# Patient Record
Sex: MALE | Race: WHITE | Employment: FULL TIME | URBAN - METROPOLITAN AREA
[De-identification: names, ages, dates, MRNs, and addresses within clinical notes are randomized per-mention and may not be internally consistent; named-entity substitution may affect disease eponyms.]

---

## 2024-04-11 ENCOUNTER — HOSPITAL ENCOUNTER (OUTPATIENT)
Dept: GENERAL RADIOLOGY | Facility: CLINIC | Age: 44
Discharge: HOME | End: 2024-04-11
Attending: SPECIALIST | Admitting: SPECIALIST
Payer: COMMERCIAL

## 2024-04-11 DIAGNOSIS — Z22.7 LATENT TUBERCULOSIS INFECTION: ICD-10-CM

## 2024-04-11 PROCEDURE — 71046 X-RAY EXAM CHEST 2 VIEWS: CPT

## 2024-04-19 DIAGNOSIS — Z22.7 INACTIVE TUBERCULOSIS: Primary | ICD-10-CM

## 2024-04-22 ENCOUNTER — LAB (OUTPATIENT)
Dept: LAB | Facility: OTHER | Age: 44
End: 2024-04-22
Payer: COMMERCIAL

## 2024-04-22 DIAGNOSIS — Z22.7 INACTIVE TUBERCULOSIS: ICD-10-CM

## 2024-04-22 LAB
ALBUMIN SERPL BCG-MCNC: 5 G/DL (ref 3.5–5.2)
ALP SERPL-CCNC: 74 U/L (ref 40–150)
ALT SERPL W P-5'-P-CCNC: 36 U/L (ref 0–70)
AST SERPL W P-5'-P-CCNC: 29 U/L (ref 0–45)
BASOPHILS # BLD AUTO: 0 10E3/UL (ref 0–0.2)
BASOPHILS NFR BLD AUTO: 1 %
BILIRUB DIRECT SERPL-MCNC: <0.2 MG/DL (ref 0–0.3)
BILIRUB SERPL-MCNC: 0.5 MG/DL
CREAT SERPL-MCNC: 1.12 MG/DL (ref 0.67–1.17)
EGFRCR SERPLBLD CKD-EPI 2021: 83 ML/MIN/1.73M2
EOSINOPHIL # BLD AUTO: 0.5 10E3/UL (ref 0–0.7)
EOSINOPHIL NFR BLD AUTO: 6 %
ERYTHROCYTE [DISTWIDTH] IN BLOOD BY AUTOMATED COUNT: 14.2 % (ref 10–15)
HCT VFR BLD AUTO: 45.7 % (ref 40–53)
HGB BLD-MCNC: 14.9 G/DL (ref 13.3–17.7)
IMM GRANULOCYTES # BLD: 0 10E3/UL
IMM GRANULOCYTES NFR BLD: 0 %
LYMPHOCYTES # BLD AUTO: 3.2 10E3/UL (ref 0.8–5.3)
LYMPHOCYTES NFR BLD AUTO: 38 %
MCH RBC QN AUTO: 27.3 PG (ref 26.5–33)
MCHC RBC AUTO-ENTMCNC: 32.6 G/DL (ref 31.5–36.5)
MCV RBC AUTO: 84 FL (ref 78–100)
MONOCYTES # BLD AUTO: 0.7 10E3/UL (ref 0–1.3)
MONOCYTES NFR BLD AUTO: 8 %
NEUTROPHILS # BLD AUTO: 4 10E3/UL (ref 1.6–8.3)
NEUTROPHILS NFR BLD AUTO: 48 %
PLATELET # BLD AUTO: 391 10E3/UL (ref 150–450)
PROT SERPL-MCNC: 7.7 G/DL (ref 6.4–8.3)
RBC # BLD AUTO: 5.45 10E6/UL (ref 4.4–5.9)
WBC # BLD AUTO: 8.4 10E3/UL (ref 4–11)

## 2024-04-22 PROCEDURE — 80076 HEPATIC FUNCTION PANEL: CPT

## 2024-04-22 PROCEDURE — 82565 ASSAY OF CREATININE: CPT

## 2024-04-22 PROCEDURE — 85025 COMPLETE CBC W/AUTO DIFF WBC: CPT

## 2024-04-22 PROCEDURE — 36415 COLL VENOUS BLD VENIPUNCTURE: CPT

## 2024-04-23 DIAGNOSIS — Z22.7 INACTIVE TUBERCULOSIS: Primary | ICD-10-CM

## 2024-05-06 ENCOUNTER — LAB (OUTPATIENT)
Dept: LAB | Facility: OTHER | Age: 44
End: 2024-05-06
Payer: COMMERCIAL

## 2024-05-06 DIAGNOSIS — Z22.7 INACTIVE TUBERCULOSIS: ICD-10-CM

## 2024-05-06 LAB
ALBUMIN SERPL BCG-MCNC: 4.9 G/DL (ref 3.5–5.2)
ALP SERPL-CCNC: 75 U/L (ref 40–150)
ALT SERPL W P-5'-P-CCNC: 59 U/L (ref 0–70)
AST SERPL W P-5'-P-CCNC: 38 U/L (ref 0–45)
BILIRUB DIRECT SERPL-MCNC: <0.2 MG/DL (ref 0–0.3)
BILIRUB SERPL-MCNC: 0.5 MG/DL
PROT SERPL-MCNC: 7.6 G/DL (ref 6.4–8.3)

## 2024-05-06 PROCEDURE — 36415 COLL VENOUS BLD VENIPUNCTURE: CPT

## 2024-05-06 PROCEDURE — 80076 HEPATIC FUNCTION PANEL: CPT

## 2024-06-03 ENCOUNTER — LAB (OUTPATIENT)
Dept: LAB | Facility: OTHER | Age: 44
End: 2024-06-03
Payer: COMMERCIAL

## 2024-06-03 DIAGNOSIS — Z22.7 INACTIVE TUBERCULOSIS: ICD-10-CM

## 2024-06-03 LAB
ALBUMIN SERPL BCG-MCNC: 4.7 G/DL (ref 3.5–5.2)
ALP SERPL-CCNC: 69 U/L (ref 40–150)
ALT SERPL W P-5'-P-CCNC: 51 U/L (ref 0–70)
AST SERPL W P-5'-P-CCNC: 36 U/L (ref 0–45)
BILIRUB DIRECT SERPL-MCNC: <0.2 MG/DL (ref 0–0.3)
BILIRUB SERPL-MCNC: 0.4 MG/DL
PROT SERPL-MCNC: 7.4 G/DL (ref 6.4–8.3)

## 2024-06-03 PROCEDURE — 36415 COLL VENOUS BLD VENIPUNCTURE: CPT

## 2024-06-03 PROCEDURE — 80076 HEPATIC FUNCTION PANEL: CPT

## 2024-07-01 ENCOUNTER — LAB (OUTPATIENT)
Dept: LAB | Facility: OTHER | Age: 44
End: 2024-07-01
Payer: COMMERCIAL

## 2024-07-01 DIAGNOSIS — Z22.7 INACTIVE TUBERCULOSIS: ICD-10-CM

## 2024-07-01 LAB
ALBUMIN SERPL BCG-MCNC: 4.8 G/DL (ref 3.5–5.2)
ALP SERPL-CCNC: 69 U/L (ref 40–150)
ALT SERPL W P-5'-P-CCNC: 44 U/L (ref 0–70)
AST SERPL W P-5'-P-CCNC: 36 U/L (ref 0–45)
BILIRUB DIRECT SERPL-MCNC: <0.2 MG/DL (ref 0–0.3)
BILIRUB SERPL-MCNC: 0.3 MG/DL
PROT SERPL-MCNC: 7.6 G/DL (ref 6.4–8.3)

## 2024-07-01 PROCEDURE — 36415 COLL VENOUS BLD VENIPUNCTURE: CPT

## 2024-07-01 PROCEDURE — 80076 HEPATIC FUNCTION PANEL: CPT

## 2024-10-21 ENCOUNTER — OFFICE VISIT (OUTPATIENT)
Dept: FAMILY MEDICINE | Facility: OTHER | Age: 44
End: 2024-10-21
Payer: COMMERCIAL

## 2024-10-21 VITALS
DIASTOLIC BLOOD PRESSURE: 87 MMHG | BODY MASS INDEX: 32.15 KG/M2 | RESPIRATION RATE: 15 BRPM | SYSTOLIC BLOOD PRESSURE: 127 MMHG | WEIGHT: 250.5 LBS | TEMPERATURE: 96.9 F | HEART RATE: 72 BPM | OXYGEN SATURATION: 91 % | HEIGHT: 74 IN

## 2024-10-21 DIAGNOSIS — B96.89 BACTERIAL SINUSITIS: Primary | ICD-10-CM

## 2024-10-21 DIAGNOSIS — J32.9 BACTERIAL SINUSITIS: Primary | ICD-10-CM

## 2024-10-21 PROCEDURE — 99213 OFFICE O/P EST LOW 20 MIN: CPT | Performed by: STUDENT IN AN ORGANIZED HEALTH CARE EDUCATION/TRAINING PROGRAM

## 2024-10-21 RX ORDER — DOXYCYCLINE 100 MG/1
100 CAPSULE ORAL 2 TIMES DAILY
Qty: 14 CAPSULE | Refills: 0 | Status: SHIPPED | OUTPATIENT
Start: 2024-10-21 | End: 2024-10-28

## 2024-10-21 ASSESSMENT — PAIN SCALES - GENERAL: PAINLEVEL: NO PAIN (0)

## 2024-10-21 NOTE — PATIENT INSTRUCTIONS
Symptomatic management for your sinuses  --    General - Drink extra water and other fluids (water being the best), LOTS of hand washing (or hand ) and covering coughs, Avoiding smoking and exposure to secondhand smoke, Rest (consider work/school note), Time - these infections can last up to 2 weeks or more, consider NoseFrida (or bulb suctioning) in Kids    Analgesics and antipyretics (such as tylenol and ibuprofen) -- Over-the-counter analgesics and antipyretics such as nonsteroidal antiinflammatory drugs (NSAIDs) and acetaminophen can be used for pain and fever relief as needed assuming it is safe to take these medications with a patients past medical history     Saline irrigation (such as Simple Saline or Netti Pot) -- Mechanical irrigation with buffered, physiologic, or hypertonic saline may reduce the need for pain medication and improve overall patient comfort, particularly in patients with frequent sinus infections. Sterile intranasal saline spray may temporarily improve nasal passage patency by moisturizing and loosening secretions.    Intranasal glucocorticoids (such as Flonase) -- Studies have shown small symptomatic benefits and minimal adverse effects with short-term use of intranasal glucocorticoids for patients with both viral and bacterial ARS. Intranasal glucocorticoids are likely to be most beneficial for patients with underlying allergic rhinitis. The mechanism of action is a decrease in mucosal inflammation that allows improved sinus drainage. It is BEST to use Simple saline/Netti Pot right before using nasal sprays    Oral decongestants (such as Sudafed) - Oral decongestants may be useful when Eustachian tube dysfunction (plugged ear feeling) is a factor for patients with AVRS. These patients may benefit from a short course (three to five days) of oral decongestants. Oral decongestants should be used with caution in patients with cardiovascular disease, hypertension,  angle-closure glaucoma, or bladder neck obstruction     Intranasal decongestants (such as Afrin) - Intranasal decongestants are often used as symptomatic therapies by patients. These agents, such as oxymetazoline, may provide a subjective sense of improved nasal patency. If used, topical decongestants should be used sparingly for no more than three consecutive days to avoid rebound congestion, addiction, and mucosal damage associated with long-term use    Antihistamines (such as Zyrtec, Allegra, Claritin) - Antihistamines are frequently used for symptom relief due to their drying effects    Mucolytics (such as Mucinex or Robitussin) - Mucolytics such as guaifenesin serve to thin secretions and may promote ease of mucus drainage and clearance    Steam inhalation or  tenting  - Inhalation of warm, humidified air (steam), may provide patients with a transient sense of relief of congestion. If steam is used, care should be taken to ensure that the source of steam is clean, without mold or other contaminants.    If you feel that symptoms are worsening or if you are not getting better in a timely manor, would be best to get rechecked or seen more emergently (such as the ER).       Reference: [place]. Reviewed: 2023

## 2024-10-21 NOTE — PROGRESS NOTES
Assessment & Plan     Bacterial sinusitis  - doxycycline hyclate (VIBRAMYCIN) 100 MG capsule; Take 1 capsule (100 mg) by mouth 2 times daily for 7 days.  Patient with ongoing and overall chronic sinus congestion for the better part of a year, this originally started after he stopped smoking.  He has tried multiple over-the-counter treatment options with little benefit, he does mention Afrin has been the most successful for him, he states he does use this sometimes several times a week with a short duration of relief but has returned.  I do have a concern for rhinitis medicamentosa, I do want him to stop this completely but consider other over-the-counter nasal spray options including Flonase and Nasacort.  Oral options were discussed and other conservative measures outlined on the AVS, plan for 6-week follow-up for annual checkup        Alyse Wang is a 44 year old, presenting for the following health issues:  Sinus Problem        10/21/2024     9:06 AM   Additional Questions   Roomed by linda   Accompanied by self         10/21/2024     9:06 AM   Patient Reported Additional Medications   Patient reports taking the following new medications LIALDA ,traZODone 100 mg     History of Present Illness       Reason for visit:  Sinus congesstion  Symptom onset:  More than a month   He is taking medications regularly.         Concern - sinus problem  Onset: June 2024  Description: left nostril is plugged more then right side, clear mucus, dentist noticed sinuses were inflamed based on xray  Intensity: moderate  Progression of Symptoms:  worsening  Accompanying Signs & Symptoms: none  Previous history of similar problem: none  Precipitating factors:        Worsened by: none  Alleviating factors:        Improved by: afirn  Therapies tried and outcome: afirn         Review of Systems  Constitutional, HEENT, cardiovascular, pulmonary, gi and gu systems are negative, except as otherwise noted.      Objective    BP  "127/87   Pulse 72   Temp 96.9  F (36.1  C) (Temporal)   Resp 15   Ht 1.87 m (6' 1.62\")   Wt 113.6 kg (250 lb 8 oz)   SpO2 91%   BMI 32.49 kg/m    Body mass index is 32.49 kg/m .  Physical Exam  Vitals and nursing note reviewed.   Constitutional:       General: He is not in acute distress.     Appearance: Normal appearance. He is not ill-appearing, toxic-appearing or diaphoretic.   HENT:      Head: Normocephalic.      Right Ear: External ear normal.      Left Ear: External ear normal.      Nose: No rhinorrhea.   Eyes:      General:         Right eye: No discharge.         Left eye: No discharge.      Extraocular Movements: Extraocular movements intact.      Conjunctiva/sclera: Conjunctivae normal.      Pupils: Pupils are equal, round, and reactive to light.   Pulmonary:      Effort: Pulmonary effort is normal. No respiratory distress.   Musculoskeletal:         General: Normal range of motion.      Cervical back: Normal range of motion.   Neurological:      Mental Status: He is alert and oriented to person, place, and time.   Psychiatric:         Mood and Affect: Mood normal.         Behavior: Behavior normal.            Signed Electronically by: DORON PEREZ MD    "

## 2024-12-02 ENCOUNTER — OFFICE VISIT (OUTPATIENT)
Dept: FAMILY MEDICINE | Facility: OTHER | Age: 44
End: 2024-12-02
Payer: COMMERCIAL

## 2024-12-02 VITALS
SYSTOLIC BLOOD PRESSURE: 136 MMHG | TEMPERATURE: 98 F | HEART RATE: 70 BPM | WEIGHT: 255 LBS | RESPIRATION RATE: 18 BRPM | OXYGEN SATURATION: 97 % | HEIGHT: 73 IN | BODY MASS INDEX: 33.8 KG/M2 | DIASTOLIC BLOOD PRESSURE: 84 MMHG

## 2024-12-02 DIAGNOSIS — Z22.7 INACTIVE TUBERCULOSIS: ICD-10-CM

## 2024-12-02 DIAGNOSIS — K51.918 ULCERATIVE COLITIS WITH OTHER COMPLICATION, UNSPECIFIED LOCATION (H): ICD-10-CM

## 2024-12-02 DIAGNOSIS — Z00.00 ROUTINE GENERAL MEDICAL EXAMINATION AT A HEALTH CARE FACILITY: Primary | ICD-10-CM

## 2024-12-02 DIAGNOSIS — E66.9 OBESITY (BMI 30-39.9): ICD-10-CM

## 2024-12-02 DIAGNOSIS — Z11.59 NEED FOR HEPATITIS C SCREENING TEST: ICD-10-CM

## 2024-12-02 DIAGNOSIS — Z11.4 SCREENING FOR HIV (HUMAN IMMUNODEFICIENCY VIRUS): ICD-10-CM

## 2024-12-02 DIAGNOSIS — Z12.5 SCREENING FOR PROSTATE CANCER: ICD-10-CM

## 2024-12-02 DIAGNOSIS — J32.1 CHRONIC FRONTAL SINUSITIS: ICD-10-CM

## 2024-12-02 DIAGNOSIS — F51.01 PRIMARY INSOMNIA: ICD-10-CM

## 2024-12-02 LAB
ALBUMIN SERPL BCG-MCNC: 4.6 G/DL (ref 3.5–5.2)
ALP SERPL-CCNC: 75 U/L (ref 40–150)
ALT SERPL W P-5'-P-CCNC: 37 U/L (ref 0–70)
AST SERPL W P-5'-P-CCNC: 31 U/L (ref 0–45)
BILIRUB DIRECT SERPL-MCNC: <0.2 MG/DL (ref 0–0.3)
BILIRUB SERPL-MCNC: 0.3 MG/DL
CHOLEST SERPL-MCNC: 254 MG/DL
EST. AVERAGE GLUCOSE BLD GHB EST-MCNC: 131 MG/DL
FASTING STATUS PATIENT QL REPORTED: YES
HBA1C MFR BLD: 6.2 % (ref 0–5.6)
HDLC SERPL-MCNC: 35 MG/DL
LDLC SERPL CALC-MCNC: 181 MG/DL
NONHDLC SERPL-MCNC: 219 MG/DL
PROT SERPL-MCNC: 7.3 G/DL (ref 6.4–8.3)
PSA SERPL DL<=0.01 NG/ML-MCNC: 0.34 NG/ML (ref 0–2.5)
TRIGL SERPL-MCNC: 190 MG/DL

## 2024-12-02 PROCEDURE — 99396 PREV VISIT EST AGE 40-64: CPT | Performed by: STUDENT IN AN ORGANIZED HEALTH CARE EDUCATION/TRAINING PROGRAM

## 2024-12-02 PROCEDURE — 99213 OFFICE O/P EST LOW 20 MIN: CPT | Mod: 25 | Performed by: STUDENT IN AN ORGANIZED HEALTH CARE EDUCATION/TRAINING PROGRAM

## 2024-12-02 PROCEDURE — 83036 HEMOGLOBIN GLYCOSYLATED A1C: CPT | Performed by: STUDENT IN AN ORGANIZED HEALTH CARE EDUCATION/TRAINING PROGRAM

## 2024-12-02 PROCEDURE — 80076 HEPATIC FUNCTION PANEL: CPT | Performed by: STUDENT IN AN ORGANIZED HEALTH CARE EDUCATION/TRAINING PROGRAM

## 2024-12-02 PROCEDURE — 36415 COLL VENOUS BLD VENIPUNCTURE: CPT | Performed by: STUDENT IN AN ORGANIZED HEALTH CARE EDUCATION/TRAINING PROGRAM

## 2024-12-02 PROCEDURE — G0103 PSA SCREENING: HCPCS | Performed by: STUDENT IN AN ORGANIZED HEALTH CARE EDUCATION/TRAINING PROGRAM

## 2024-12-02 PROCEDURE — 86803 HEPATITIS C AB TEST: CPT | Performed by: STUDENT IN AN ORGANIZED HEALTH CARE EDUCATION/TRAINING PROGRAM

## 2024-12-02 PROCEDURE — 80061 LIPID PANEL: CPT | Performed by: STUDENT IN AN ORGANIZED HEALTH CARE EDUCATION/TRAINING PROGRAM

## 2024-12-02 RX ORDER — TRAZODONE HYDROCHLORIDE 100 MG/1
100 TABLET ORAL AT BEDTIME
COMMUNITY

## 2024-12-02 SDOH — HEALTH STABILITY: PHYSICAL HEALTH: ON AVERAGE, HOW MANY DAYS PER WEEK DO YOU ENGAGE IN MODERATE TO STRENUOUS EXERCISE (LIKE A BRISK WALK)?: 1 DAY

## 2024-12-02 SDOH — HEALTH STABILITY: PHYSICAL HEALTH: ON AVERAGE, HOW MANY MINUTES DO YOU ENGAGE IN EXERCISE AT THIS LEVEL?: 30 MIN

## 2024-12-02 ASSESSMENT — SOCIAL DETERMINANTS OF HEALTH (SDOH): HOW OFTEN DO YOU GET TOGETHER WITH FRIENDS OR RELATIVES?: TWICE A WEEK

## 2024-12-02 ASSESSMENT — PAIN SCALES - GENERAL: PAINLEVEL_OUTOF10: NO PAIN (0)

## 2024-12-02 NOTE — PROGRESS NOTES
"Preventive Care Visit  [facility]  DORON PEREZ MD, Family Medicine  Dec 2, 2024      Assessment & Plan     Routine general medical examination at a health care facility  Health maintenance reviewed and updated  - Lipid panel reflex to direct LDL Fasting    Screening for HIV (human immunodeficiency virus)  Previously screened, negative    Need for hepatitis C screening test  Due for screening  - Hepatitis C Screen Reflex to HCV RNA Quant and Genotype    Primary insomnia  Stable on trazodone 100 mg    Ulcerative colitis with other complication, unspecified location (H)  Follows up with [facility], patient plans on getting his most recent progress notes faxed over to for review.  On LIALDA 1.2 g    Screening for prostate cancer  - PSA, screen    Obesity (BMI 30-39.9)  - Hemoglobin A1c  Healthy lifestyle measures discussed including diet and exercise options. Also, counseled that weight loss would help management and/or prevention of diseases such as diabetes, hypertension, hyperlipidemia, etc...     Chronic frontal sinusitis  Has been giving him issues for multiple years, recent antibiotics did not make any difference for him, takes daily antihistamines which I encouraged him to continue but will also do a trial of Flonase or Nasacort daily for the next couple weeks.  Nasal saline rinses as needed.  Could consider future referral to ENT  - Adult ENT  Referral; Future    Inactive tuberculosis  Follows up with infectious disease.  Previously completed a 4-month course of rifampin, negative chest x-ray  - Hepatic function panel            BMI  Estimated body mass index is 33.25 kg/m  as calculated from the following:    Height as of this encounter: 1.865 m (6' 1.43\").    Weight as of this encounter: 115.7 kg (255 lb).   Weight management plan: Discussed healthy diet and exercise guidelines    Counseling  Appropriate preventive services were addressed with this patient via " screening, questionnaire, or discussion as appropriate for fall prevention, nutrition, physical activity, Tobacco-use cessation, social engagement, weight loss and cognition.  Checklist reviewing preventive services available has been given to the patient.  Reviewed patient's diet, addressing concerns and/or questions.   He is at risk for lack of exercise and has been provided with information to increase physical activity for the benefit of his well-being.   The patient was instructed to see the dentist every 6 months.           Alyse Wang is a 44 year old, presenting for the following:  Physical        12/2/2024     1:47 PM   Additional Questions   Roomed by linda   Accompanied by self         12/2/2024     1:47 PM   Patient Reported Additional Medications   Patient reports taking the following new medications LIALDA 1.2 g          HPI    Concern - Sinus problem  Onset: 6 months ago  Description: clogged when trying to breathing through, clear mucus, 3 weeks ago-green mucus and lost voice but feels better  Intensity: moderate  Progression of Symptoms:  same  Accompanying Signs & Symptoms: none  Previous history of similar problem: yes  Precipitating factors:        Worsened by: none  Alleviating factors:        Improved by: none  Therapies tried and outcome: sudafed, nasal drops    Health Care Directive  Patient does not have a Health Care Directive:     Discussed advance care planning with patient; information given to patient to review.      12/2/2024   General Health   How would you rate your overall physical health? Good   Feel stress (tense, anxious, or unable to sleep) Not at all            12/2/2024   Nutrition   Three or more servings of calcium each day? Yes   Diet: Regular (no restrictions)   How many servings of fruit and vegetables per day? (!) 2-3   How many sweetened beverages each day? 0-1            12/2/2024   Exercise   Days per week of moderate/strenous exercise 1 day   Average  "minutes spent exercising at this level 30 min      (!) EXERCISE CONCERN      12/2/2024   Social Factors   Frequency of gathering with friends or relatives Twice a week   Worry food won't last until get money to buy more No   Food not last or not have enough money for food? No   Do you have housing? (Housing is defined as stable permanent housing and does not include staying ouside in a car, in a tent, in an abandoned building, in an overnight shelter, or couch-surfing.) Yes   Are you worried about losing your housing? No   Lack of transportation? No   Unable to get utilities (heat,electricity)? No            12/2/2024   Dental   Dentist two times every year? (!) NO            12/2/2024   TB Screening   Were you born outside of the [place]? No                  12/2/2024   Substance Use   Alcohol more than 3/day or more than 7/wk No   Do you use any other substances recreationally? No        Social History     Tobacco Use    Smoking status: Former     Types: Cigarettes   Substance Use Topics    Alcohol use: No             12/2/2024   One time HIV Screening   Previous HIV test? Yes          12/2/2024   STI Screening   New sexual partner(s) since last STI/HIV test? No      ASCVD Risk   The ASCVD Risk score (Anne RIGGINS, et al., 2019) failed to calculate for the following reasons:    Cannot find a previous HDL lab    Cannot find a previous total cholesterol lab        12/2/2024   Contraception/Family Planning   Questions about contraception or family planning No           Reviewed and updated as needed this visit by Provider                          Review of Systems  Constitutional, HEENT, cardiovascular, pulmonary, gi and gu systems are negative, except as otherwise noted.     Objective    Exam  /84   Pulse 70   Temp 98  F (36.7  C) (Temporal)   Resp 18   Ht 1.865 m (6' 1.43\")   Wt 115.7 kg (255 lb)   SpO2 97%   BMI 33.25 kg/m     Estimated body mass index is 33.25 kg/m  as calculated from the " "following:    Height as of this encounter: 1.865 m (6' 1.43\").    Weight as of this encounter: 115.7 kg (255 lb).    Physical Exam  GENERAL: alert and no distress  EYES: Eyes grossly normal to inspection, PERRL and conjunctivae and sclerae normal  HENT: ear canals and TM's normal, nose and mouth without ulcers or lesions  NECK: no adenopathy, no asymmetry, masses, or scars  RESP: lungs clear to auscultation - no rales, rhonchi or wheezes  CV: regular rate and rhythm, normal S1 S2, no S3 or S4, no murmur, click or rub, no peripheral edema  ABDOMEN: soft, nontender, no hepatosplenomegaly, no masses and bowel sounds normal  MS: no gross musculoskeletal defects noted, no edema  SKIN: no suspicious lesions or rashes  NEURO: Normal strength and tone, mentation intact and speech normal  PSYCH: mentation appears normal, affect normal/bright        Signed Electronically by: DORON PEREZ MD    "

## 2024-12-02 NOTE — PATIENT INSTRUCTIONS
Seasonal Allergies     1. Antihistamine (Zyrtec/Cetirizine, Allegra/fexofenadine, Claritin/loratadine)  also for itch, sneeze, runny nose, watery eyes, itchy throat, or postnasal drip). Cetirizine tends to be least expensive at [employer] or at [employer].  [employer] is a good place to look for inexpensive cetirizine online. For best results use in combination with:    2. Nasacort (Blue top) daily for up to 10-14 days    3. Netti Pot or simple saline rinse as needed if it helps you (if doing this, do it before using Flonase/Nasacort)      For adults, incidence of drowsiness from antihistamines is 10% with cetirizine (Zyrtec), 1% with loratadine (Claritin), and 0.1% with fexofenadine (Allegra).  Likelihood of drowsiness from antihistamines increases at higher doses, and higher doses are often required for control of urticarial itching.  It is common to use up to 4 times the FDA-recommended doses of cetirizine, loratadine, or fexofenadine.    Reviewed: 2023             Patient Education   Preventive Care Advice   This is general advice given by our system to help you stay healthy. However, your care team may have specific advice just for you. Please talk to your care team about your preventive care needs.  Nutrition  Eat 5 or more servings of fruits and vegetables each day.  Try wheat bread, brown rice and whole grain pasta (instead of white bread, rice, and pasta).  Get enough calcium and vitamin D. Check the label on foods and aim for 100% of the RDA (recommended daily allowance).  Lifestyle  Exercise at least 150 minutes each week  (30 minutes a day, 5 days a week).  Do muscle strengthening activities 2 days a week. These help control your weight and prevent disease.  No smoking.  Wear sunscreen to prevent skin cancer.  Have a dental exam and cleaning every 6 months.  Yearly exams  See your health care team every year to talk about:  Any changes in your health.  Any medicines your care team has  prescribed.  Preventive care, family planning, and ways to prevent chronic diseases.  Shots (vaccines)   HPV shots (up to age 26), if you've never had them before.  Hepatitis B shots (up to age 59), if you've never had them before.  COVID-19 shot: Get this shot when it's due.  Flu shot: Get a flu shot every year.  Tetanus shot: Get a tetanus shot every 10 years.  Pneumococcal, hepatitis A, and RSV shots: Ask your care team if you need these based on your risk.  Shingles shot (for age 50 and up)  General health tests  Diabetes screening:  Starting at age 35, Get screened for diabetes at least every 3 years.  If you are younger than age 35, ask your care team if you should be screened for diabetes.  Cholesterol test: At age 39, start having a cholesterol test every 5 years, or more often if advised.  Bone density scan (DEXA): At age 50, ask your care team if you should have this scan for osteoporosis (brittle bones).  Hepatitis C: Get tested at least once in your life.  STIs (sexually transmitted infections)  Before age 24: Ask your care team if you should be screened for STIs.  After age 24: Get screened for STIs if you're at risk. You are at risk for STIs (including HIV) if:  You are sexually active with more than one person.  You don't use condoms every time.  You or a partner was diagnosed with a sexually transmitted infection.  If you are at risk for HIV, ask about PrEP medicine to prevent HIV.  Get tested for HIV at least once in your life, whether you are at risk for HIV or not.  Cancer screening tests  Cervical cancer screening: If you have a cervix, begin getting regular cervical cancer screening tests starting at age 21.  Breast cancer scan (mammogram): If you've ever had breasts, begin having regular mammograms starting at age 40. This is a scan to check for breast cancer.  Colon cancer screening: It is important to start screening for colon cancer at age 45.  Have a colonoscopy test every 10 years (or more  often if you're at risk) Or, ask your provider about stool tests like a FIT test every year or Cologuard test every 3 years.  To learn more about your testing options, visit:   .  For help making a decision, visit:   https://bit.ly/hn11816.  Prostate cancer screening test: If you have a prostate, ask your care team if a prostate cancer screening test (PSA) at age 55 is right for you.  Lung cancer screening: If you are a current or former smoker ages 50 to 80, ask your care team if ongoing lung cancer screenings are right for you.  For informational purposes only. Not to replace the advice of your health care provider. Copyright   2023 [facility]. All rights reserved. Clinically reviewed by the [facility] Transitions Program. [employer] [id number] - REV 01/24.

## 2024-12-03 LAB — HCV AB SERPL QL IA: NONREACTIVE

## 2024-12-03 NOTE — RESULT ENCOUNTER NOTE
Team - please call patient with results.      Cholesterol levels are elevated, not to the point where I would recommended any prescription medication. I would encourage healthy lifestyle choices including improved diet and more exercise.  Any exercise is reasonable as long as you are elevating your heart rate and breaking a sweat for up to 20 to 30 minutes/day.  In regards to dietary changes cut down on processed food, eliminate added sugar, add more fiber, add more water, and cut down on refined carbohydrates (white bread, pastries, cereals, pastas, etc...).  It is best to home-cook your meals and if you need a snack in between meals consider fruits and vegetables.    Diabetes mellitus screening is within pre diabetes mellitus range, once again no meds needed. Continue with the diet and exercise changes as above    All other labs normal/negative.     Thank you,    Deshawn Camacho MD

## 2025-01-16 NOTE — PROGRESS NOTES
ENT Consultation    Felipe De Leon who is a 44 year old male seen in consultation at the request of Dr. Hemal Camacho.      History of Present Illness - Felipe De Leon is a 44 year old male presents for evaluation of nasal congestion/obstruction.  Is been ongoing for years worse in the last couple years.  He was a smoker but quit 2 years ago.  Initially had less congestion than normal.  Also in the last year to 2 years since sense of smell significantly deteriorated.  Sense of taste apparently remains intact.  He is a mouth breather lately dry mouth in the mornings.  Minimal snoring.  Some maxillary sinus pressure at times but no other sinusitis symptoms.  Clear discharge noted anteriorly and posteriorly without changes in color.  Head CT in 2020 did show evidence of some paranasal sinus disease.  EXAM: CT HEAD BRAIN WO   LOCATION: [facility]   DATE/TIME: 7/23/2020 7:14 PM     INDICATION: Altered mental status. Confusion.   COMPARISON: None.   TECHNIQUE: Routine without IV contrast. Multiplanar reformats. Dose reduction   techniques were used.     FINDINGS:   INTRACRANIAL CONTENTS: No intracranial hemorrhage, extraaxial collection, or   mass effect.  No CT evidence of acute infarct. Normal parenchymal attenuation.   Normal ventricles and sulci.     VISUALIZED ORBITS/SINUSES/MASTOIDS: No intraorbital abnormality. Moderately   mucosal thickening in the ethmoid air cells and right maxillary sinus. Mild   mucosal thickening in the left maxillary sinus. No middle ear or mastoid   effusion.     BONES/SOFT TISSUES: No acute abnormality.     IMPRESSION:   1. The brain is unremarkable.   2.  Mucosal thickening in the paranasal sinuses.   There is no height or weight on file to calculate BMI.    Weight management plan: Patient was referred to their PCP to discuss a diet and exercise plan.    BP Readings from Last 1 Encounters:   12/02/24 136/84       BP noted to be well controlled today in office.     Felipe BAUTISTA NOT  a smoker/uses chewing tobacco.  Felipe already quit      Past Medical History - No past medical history on file.    Current Medications -   Current Outpatient Medications:     traZODone (DESYREL) 100 MG tablet, Take 100 mg by mouth at bedtime., Disp: , Rfl:     Allergies - No Known Allergies    Social History -   Social History     Socioeconomic History    Marital status:    Tobacco Use    Smoking status: Former     Types: Cigarettes    Tobacco comments:     Quit in July 2022, current use of nicotine gum or lozenge      Started smoking aroung age 13, quit age 43. About 1-2 ppd. About 45 pack years    Substance and Sexual Activity    Alcohol use: No     Social Drivers of Health     Financial Resource Strain: Low Risk  (12/2/2024)    Financial Resource Strain     Within the past 12 months, have you or your family members you live with been unable to get utilities (heat, electricity) when it was really needed?: No   Food Insecurity: Low Risk  (12/2/2024)    Food Insecurity     Within the past 12 months, did you worry that your food would run out before you got money to buy more?: No     Within the past 12 months, did the food you bought just not last and you didn t have money to get more?: No   Transportation Needs: Low Risk  (12/2/2024)    Transportation Needs     Within the past 12 months, has lack of transportation kept you from medical appointments, getting your medicines, non-medical meetings or appointments, work, or from getting things that you need?: No   Physical Activity: Insufficiently Active (12/2/2024)    Exercise Vital Sign     Days of Exercise per Week: 1 day     Minutes of Exercise per Session: 30 min   Stress: No Stress Concern Present (12/2/2024)    [employer] [place] - Occupational Stress Questionnaire     Feeling of Stress : Not at all   Social Connections: Unknown (12/2/2024)    Social Connection and Isolation Panel [NHANES]     Frequency of Social Gatherings with  Friends and Family: Twice a week   Interpersonal Safety: Low Risk  (12/2/2024)    Interpersonal Safety     Do you feel physically and emotionally safe where you currently live?: Yes     Within the past 12 months, have you been hit, slapped, kicked or otherwise physically hurt by someone?: No     Within the past 12 months, have you been humiliated or emotionally abused in other ways by your partner or ex-partner?: No   Housing Stability: Low Risk  (12/2/2024)    Housing Stability     Do you have housing? : Yes     Are you worried about losing your housing?: No       Family History - No family history on file.    Review of Systems - As per HPI and PMHx, otherwise review of system review of the head and neck negative. Otherwise 10+ review of system is negative    Physical Exam  There were no vitals taken for this visit.  BMI: There is no height or weight on file to calculate BMI.    General - The patient is well nourished and well developed, and appears to have good nutritional status.  Alert and oriented to person and place, answers questions and cooperates with examination appropriately.    SKIN - No suspicious lesions or rashes.  Respiration - No respiratory distress.  Head and Face - Normocephalic and atraumatic, with no gross asymmetry noted of the contour of the facial features.  The facial nerve is intact, with strong symmetric movements.    Voice and Breathing - The patient was breathing comfortably without the use of accessory muscles. The patients voice was clear and strong, and had appropriate pitch and quality.    Ears - Bilateral pinna and EACs with normal appearing overlying skin. Tympanic membrane intact with good mobility on pneumatic otoscopy bilaterally. Bony landmarks of the ossicular chain are normal. The tympanic membranes are normal in appearance. No retraction, perforation, or masses.  No fluid or purulence was seen in the external canal or the middle ear.     Eyes - Extraocular movements intact.   Sclera were not icteric or injected, conjunctiva were pink and moist.    Mouth - Examination of the oral cavity showed pink, healthy oral mucosa. No lesions or ulcerations noted.  The tongue was mobile and midline, and the dentition were in good condition.      Throat - The walls of the oropharynx were smooth, pink, moist, symmetric, and had no lesions or ulcerations.  The tonsillar pillars and soft palate were symmetric.  The uvula was midline on elevation.    Neck - Normal midline excursion of the laryngotracheal complex during swallowing.  Full range of motion on passive movement.  Palpation of the occipital, submental, submandibular, internal jugular chain, and supraclavicular nodes did not demonstrate any abnormal lymph nodes or masses.  The carotid pulse was palpable bilaterally.  Palpation of the thyroid was soft and smooth, with no nodules or goiter appreciated.  The trachea was mobile and midline.    Nose - External contour is symmetric, no gross deflection or scars.  Nasal mucosa is pink and moist with no abnormal mucus.  The septum was deviated to the left and obstructive, turbinates of large size and position.  No polyps, masses, or purulence noted on examination.    Neuro - Nonfocal neuro exam is normal, CN 2 through 12 intact, normal gait and muscle tone.      Performed in clinic today:  To further evaluate the nasal cavity, I performed rigid nasal endoscopy.  I first sprayed the nasal cavity bilaterally with a mix of lidocaine and neosynephrine.  I then began on the left side using a 2.7mm, 30 degree rigid nasal endoscope.  The septum was deviated and the nasal airway was obstructed.  No abnormal secretions, purulence, however polyps were noted. The left middle turbinate and middle meatus were clearly visualized and pale and edematous in appearance with nonobstructive polyps coming out of the middle meatus.  Looking up, the olfactory cleft was unobstructed.  Going further back, the sphenoethmoid  recess was normal in appearance, with healthy appearing mucosa on the face of the sphenoid.  The nasopharynx was unremarkable, and the eustachian tube opening on this side was unobstructed.    I then turned my attention to the right side.  Once again, the septum was deviated, and the airway was open.  No abnormal secretions, purulence, however polyps were noted.  The right middle turbinate and middle meatus were clearly visualized and edematous and pale in appearance.  Nonobstructive polyps were coming out of middle meatus.  Looking up, the olfactory cleft was unobstructed.  Going further back the right sphenoethmoid recess was normal in appearance, and eustachian tube opening was unobstructed.   Red - [id number] Mytamed      A/P - Felipe De Leon is a 44 year old male patient with nasal obstruction mainly due to deviated septum and large inferior turbinates.  He has tried Nasacort in the past without much relief.  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  At this point will start on budesonide irrigations and given Medrol Dosepak to shrink the polypoid disease is much as possible.  This is likely explanation for his loss of smell.  In regard to nasal congestion obstruction and further evaluation would like to get allergy testing proper referral was made as well as get CT of the sinuses to further understand the extent of sinus disease.  Will see him back again in 2 months to discuss  his progress and decide if any surgical intervention would be appropriate at that point.      Bigg Moncada MD

## 2025-01-22 ENCOUNTER — OFFICE VISIT (OUTPATIENT)
Dept: OTOLARYNGOLOGY | Facility: OTHER | Age: 45
End: 2025-01-22
Attending: STUDENT IN AN ORGANIZED HEALTH CARE EDUCATION/TRAINING PROGRAM
Payer: COMMERCIAL

## 2025-01-22 VITALS
SYSTOLIC BLOOD PRESSURE: 130 MMHG | TEMPERATURE: 96.5 F | BODY MASS INDEX: 33.25 KG/M2 | HEIGHT: 73 IN | WEIGHT: 250.9 LBS | DIASTOLIC BLOOD PRESSURE: 78 MMHG

## 2025-01-22 DIAGNOSIS — J34.2 DEVIATED NASAL SEPTUM: ICD-10-CM

## 2025-01-22 DIAGNOSIS — J32.9 SINUSITIS WITH NASAL POLYPS: Primary | ICD-10-CM

## 2025-01-22 DIAGNOSIS — J33.9 SINUSITIS WITH NASAL POLYPS: Primary | ICD-10-CM

## 2025-01-22 DIAGNOSIS — R43.0 LOSS OF SMELL: ICD-10-CM

## 2025-01-22 DIAGNOSIS — J34.3 HYPERTROPHY OF BOTH INFERIOR NASAL TURBINATES: ICD-10-CM

## 2025-01-22 RX ORDER — METHYLPREDNISOLONE 4 MG/1
TABLET ORAL
Qty: 21 TABLET | Refills: 0 | Status: SHIPPED | OUTPATIENT
Start: 2025-01-22

## 2025-01-22 RX ORDER — MESALAMINE 1.2 G/1
TABLET, DELAYED RELEASE ORAL
COMMUNITY
Start: 2025-01-07

## 2025-01-22 RX ORDER — BUDESONIDE 0.5 MG/2ML
INHALANT ORAL
Qty: 120 ML | Refills: 1 | Status: SHIPPED | OUTPATIENT
Start: 2025-01-22

## 2025-01-22 NOTE — LETTER
1/22/2025      Felipe De Leon  [address] [place] [address]      Dear Colleague,    Thank you for referring your patient, Felipe De Leon, to the [facility]. Please see a copy of my visit note below.    ENT Consultation    Felipe De Leon who is a 44 year old male seen in consultation at the request of Dr. Hemal Camacho.      History of Present Illness - Felipe De Leon is a 44 year old male presents for evaluation of nasal congestion/obstruction.  Is been ongoing for years worse in the last couple years.  He was a smoker but quit 2 years ago.  Initially had less congestion than normal.  Also in the last year to 2 years since sense of smell significantly deteriorated.  Sense of taste apparently remains intact.  He is a mouth breather lately dry mouth in the mornings.  Minimal snoring.  Some maxillary sinus pressure at times but no other sinusitis symptoms.  Clear discharge noted anteriorly and posteriorly without changes in color.  Head CT in 2020 did show evidence of some paranasal sinus disease.  EXAM: CT HEAD BRAIN WO   LOCATION: [facility]   DATE/TIME: 7/23/2020 7:14 PM     INDICATION: Altered mental status. Confusion.   COMPARISON: None.   TECHNIQUE: Routine without IV contrast. Multiplanar reformats. Dose reduction   techniques were used.     FINDINGS:   INTRACRANIAL CONTENTS: No intracranial hemorrhage, extraaxial collection, or   mass effect.  No CT evidence of acute infarct. Normal parenchymal attenuation.   Normal ventricles and sulci.     VISUALIZED ORBITS/SINUSES/MASTOIDS: No intraorbital abnormality. Moderately   mucosal thickening in the ethmoid air cells and right maxillary sinus. Mild   mucosal thickening in the left maxillary sinus. No middle ear or mastoid   effusion.     BONES/SOFT TISSUES: No acute abnormality.     IMPRESSION:   1. The brain is unremarkable.   2.  Mucosal thickening in the paranasal sinuses.   There is no height or weight on file to  calculate BMI.    Weight management plan: Patient was referred to their PCP to discuss a diet and exercise plan.    BP Readings from Last 1 Encounters:   12/02/24 136/84       BP noted to be well controlled today in office.     Felipe IS NOT a smoker/uses chewing tobacco.  Felipe already quit      Past Medical History - No past medical history on file.    Current Medications -   Current Outpatient Medications:      traZODone (DESYREL) 100 MG tablet, Take 100 mg by mouth at bedtime., Disp: , Rfl:     Allergies - No Known Allergies    Social History -   Social History     Socioeconomic History     Marital status:    Tobacco Use     Smoking status: Former     Types: Cigarettes     Tobacco comments:     Quit in July 2022, current use of nicotine gum or lozenge      Started smoking aroung age 13, quit age 43. About 1-2 ppd. About 45 pack years    Substance and Sexual Activity     Alcohol use: No     Social Drivers of Health     Financial Resource Strain: Low Risk  (12/2/2024)    Financial Resource Strain      Within the past 12 months, have you or your family members you live with been unable to get utilities (heat, electricity) when it was really needed?: No   Food Insecurity: Low Risk  (12/2/2024)    Food Insecurity      Within the past 12 months, did you worry that your food would run out before you got money to buy more?: No      Within the past 12 months, did the food you bought just not last and you didn t have money to get more?: No   Transportation Needs: Low Risk  (12/2/2024)    Transportation Needs      Within the past 12 months, has lack of transportation kept you from medical appointments, getting your medicines, non-medical meetings or appointments, work, or from getting things that you need?: No   Physical Activity: Insufficiently Active (12/2/2024)    Exercise Vital Sign      Days of Exercise per Week: 1 day      Minutes of Exercise per Session: 30 min   Stress: No Stress Concern Present (12/2/2024)     [facility] of [facility]ational [employer] - Occupational Stress Questionnaire      Feeling of Stress : Not at all   Social Connections: Unknown (12/2/2024)    Social Connection and Isolation Panel [NHANES]      Frequency of Social Gatherings with Friends and Family: Twice a week   Interpersonal Safety: Low Risk  (12/2/2024)    Interpersonal Safety      Do you feel physically and emotionally safe where you currently live?: Yes      Within the past 12 months, have you been hit, slapped, kicked or otherwise physically hurt by someone?: No      Within the past 12 months, have you been humiliated or emotionally abused in other ways by your partner or ex-partner?: No   Housing Stability: Low Risk  (12/2/2024)    Housing Stability      Do you have housing? : Yes      Are you worried about losing your housing?: No       Family History - No family history on file.    Review of Systems - As per HPI and PMHx, otherwise review of system review of the head and neck negative. Otherwise 10+ review of system is negative    Physical Exam  There were no vitals taken for this visit.  BMI: There is no height or weight on file to calculate BMI.    General - The patient is well nourished and well developed, and appears to have good nutritional status.  Alert and oriented to person and place, answers questions and cooperates with examination appropriately.    SKIN - No suspicious lesions or rashes.  Respiration - No respiratory distress.  Head and Face - Normocephalic and atraumatic, with no gross asymmetry noted of the contour of the facial features.  The facial nerve is intact, with strong symmetric movements.    Voice and Breathing - The patient was breathing comfortably without the use of accessory muscles. The patients voice was clear and strong, and had appropriate pitch and quality.    Ears - Bilateral pinna and EACs with normal appearing overlying skin. Tympanic membrane intact with good mobility on pneumatic otoscopy  bilaterally. Bony landmarks of the ossicular chain are normal. The tympanic membranes are normal in appearance. No retraction, perforation, or masses.  No fluid or purulence was seen in the external canal or the middle ear.     Eyes - Extraocular movements intact.  Sclera were not icteric or injected, conjunctiva were pink and moist.    Mouth - Examination of the oral cavity showed pink, healthy oral mucosa. No lesions or ulcerations noted.  The tongue was mobile and midline, and the dentition were in good condition.      Throat - The walls of the oropharynx were smooth, pink, moist, symmetric, and had no lesions or ulcerations.  The tonsillar pillars and soft palate were symmetric.  The uvula was midline on elevation.    Neck - Normal midline excursion of the laryngotracheal complex during swallowing.  Full range of motion on passive movement.  Palpation of the occipital, submental, submandibular, internal jugular chain, and supraclavicular nodes did not demonstrate any abnormal lymph nodes or masses.  The carotid pulse was palpable bilaterally.  Palpation of the thyroid was soft and smooth, with no nodules or goiter appreciated.  The trachea was mobile and midline.    Nose - External contour is symmetric, no gross deflection or scars.  Nasal mucosa is pink and moist with no abnormal mucus.  The septum was deviated to the left and obstructive, turbinates of large size and position.  No polyps, masses, or purulence noted on examination.    Neuro - Nonfocal neuro exam is normal, CN 2 through 12 intact, normal gait and muscle tone.      Performed in clinic today:  To further evaluate the nasal cavity, I performed rigid nasal endoscopy.  I first sprayed the nasal cavity bilaterally with a mix of lidocaine and neosynephrine.  I then began on the left side using a 2.7mm, 30 degree rigid nasal endoscope.  The septum was deviated and the nasal airway was obstructed.  No abnormal secretions, purulence, however polyps were  noted. The left middle turbinate and middle meatus were clearly visualized and pale and edematous in appearance with nonobstructive polyps coming out of the middle meatus.  Looking up, the olfactory cleft was unobstructed.  Going further back, the sphenoethmoid recess was normal in appearance, with healthy appearing mucosa on the face of the sphenoid.  The nasopharynx was unremarkable, and the eustachian tube opening on this side was unobstructed.    I then turned my attention to the right side.  Once again, the septum was deviated, and the airway was open.  No abnormal secretions, purulence, however polyps were noted.  The right middle turbinate and middle meatus were clearly visualized and edematous and pale in appearance.  Nonobstructive polyps were coming out of middle meatus.  Looking up, the olfactory cleft was unobstructed.  Going further back the right sphenoethmoid recess was normal in appearance, and eustachian tube opening was unobstructed.   Red - [id number] tamed      A/P - Felipe De Leon is a 44 year old male patient with nasal obstruction mainly due to deviated septum and large inferior turbinates.  He has tried Nasacort in the past without much relief.  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  At this point will start on budesonide irrigations and given Medrol Dosepak to shrink the polypoid disease is much as possible.  This is likely explanation for his loss of smell.  In regard to nasal congestion obstruction and further evaluation would like to get allergy testing proper referral was made as well as get CT of the sinuses to further understand the extent of sinus disease.  Will see him back again in 2 months to discuss  his progress and decide if any surgical intervention would be appropriate at that point.      Bigg Moncada MD       Again, thank you for allowing me to participate in the care of your patient.        Sincerely,        Bigg Moncada MD,  MD    Electronically signed

## 2025-01-22 NOTE — PATIENT INSTRUCTIONS
Budesonide Irrigations    Supplies: budesonide ampule, distilled water, saline packets, irrigation bottle ([employer]) or netti pot    You will  the budesonide prescription from your pharmacy. It will come in nebulizer ampules. You will not be inhaling the solution, but rather mixing with saline and using as a nasal irrigation.    Mix 1 budesonide ampule with 8 ounces of normal saline (1 saline packet dissolved in 8 oz distilled water). Irrigate each nostril with half the bottle twice per day.     Position your head by tilting it slightly down and to the side. The irrigation bottle side is to be on the top which will allow gravity to glide through your sinuses and exit the other nostril. Irrigate by gently squeezing the bottle until half empty, then repeat on the opposite side.    Dr. Moncada

## 2025-01-24 ENCOUNTER — TELEPHONE (OUTPATIENT)
Dept: OTOLARYNGOLOGY | Facility: CLINIC | Age: 45
End: 2025-01-24
Payer: COMMERCIAL

## 2025-01-24 NOTE — TELEPHONE ENCOUNTER
90 Day Prescription Request    budesonide (PULMICORT) 0.5 MG/2ML neb solution     Patient is asking for 90 day supply for above med. Please let pharmacy know if this is authorized and how many refills are allowed.    Hany GALVAN May on 1/24/2025 at 4:43 PM

## 2025-01-27 NOTE — TELEPHONE ENCOUNTER
ENT previously prescribed this for the patient, he would either need to get a refill from them or a reappointment with me for us to discuss since I have never prescribed this before for him      Thank you,    Hemal Camacho MD

## 2025-01-28 ENCOUNTER — OFFICE VISIT (OUTPATIENT)
Dept: ALLERGY | Facility: OTHER | Age: 45
End: 2025-01-28
Payer: COMMERCIAL

## 2025-01-28 VITALS
WEIGHT: 250.22 LBS | OXYGEN SATURATION: 96 % | SYSTOLIC BLOOD PRESSURE: 138 MMHG | HEART RATE: 69 BPM | DIASTOLIC BLOOD PRESSURE: 86 MMHG | BODY MASS INDEX: 32.63 KG/M2

## 2025-01-28 DIAGNOSIS — J32.9 SINUSITIS WITH NASAL POLYPS: ICD-10-CM

## 2025-01-28 DIAGNOSIS — J33.9 SINUSITIS WITH NASAL POLYPS: ICD-10-CM

## 2025-01-28 LAB
BASOPHILS # BLD AUTO: 0 10E3/UL (ref 0–0.2)
BASOPHILS NFR BLD AUTO: 0 %
EOSINOPHIL # BLD AUTO: 0 10E3/UL (ref 0–0.7)
EOSINOPHIL NFR BLD AUTO: 0 %
ERYTHROCYTE [DISTWIDTH] IN BLOOD BY AUTOMATED COUNT: 14 % (ref 10–15)
HCT VFR BLD AUTO: 42.4 % (ref 40–53)
HGB BLD-MCNC: 14 G/DL (ref 13.3–17.7)
IMM GRANULOCYTES # BLD: 0.1 10E3/UL
IMM GRANULOCYTES NFR BLD: 1 %
LYMPHOCYTES # BLD AUTO: 2.8 10E3/UL (ref 0.8–5.3)
LYMPHOCYTES NFR BLD AUTO: 25 %
MCH RBC QN AUTO: 27.3 PG (ref 26.5–33)
MCHC RBC AUTO-ENTMCNC: 33 G/DL (ref 31.5–36.5)
MCV RBC AUTO: 83 FL (ref 78–100)
MONOCYTES # BLD AUTO: 0.7 10E3/UL (ref 0–1.3)
MONOCYTES NFR BLD AUTO: 6 %
NEUTROPHILS # BLD AUTO: 7.7 10E3/UL (ref 1.6–8.3)
NEUTROPHILS NFR BLD AUTO: 68 %
PLATELET # BLD AUTO: 443 10E3/UL (ref 150–450)
RBC # BLD AUTO: 5.12 10E6/UL (ref 4.4–5.9)
WBC # BLD AUTO: 11.3 10E3/UL (ref 4–11)

## 2025-01-28 PROCEDURE — 36415 COLL VENOUS BLD VENIPUNCTURE: CPT | Performed by: ALLERGY & IMMUNOLOGY

## 2025-01-28 PROCEDURE — 82785 ASSAY OF IGE: CPT | Performed by: ALLERGY & IMMUNOLOGY

## 2025-01-28 PROCEDURE — 99204 OFFICE O/P NEW MOD 45 MIN: CPT | Performed by: ALLERGY & IMMUNOLOGY

## 2025-01-28 PROCEDURE — 86003 ALLG SPEC IGE CRUDE XTRC EA: CPT | Performed by: ALLERGY & IMMUNOLOGY

## 2025-01-28 PROCEDURE — 86036 ANCA SCREEN EACH ANTIBODY: CPT | Performed by: ALLERGY & IMMUNOLOGY

## 2025-01-28 PROCEDURE — 85025 COMPLETE CBC W/AUTO DIFF WBC: CPT | Performed by: ALLERGY & IMMUNOLOGY

## 2025-01-28 NOTE — PATIENT INSTRUCTIONS
Dr Anand [phone]    All visits for food challenges, venom allergy testing, and medication/drug challenges MUST be scheduled through the allergy clinic nurse. Please send a [employer] message or call the allergy scheduling line and ask to speak with Dr Anand's team for scheduling these appointments. Appointments for these visits that are made through the schedulers or via [employer] may be cancelled or rescheduled.    Allergy Shot Room ([place]): [phone]    Pulmonary Function [phone]    Prescription Assistance  If you need assistance with your prescriptions (cost, coverage, etc) please contact: [place] Prescription Assistance Program [phone]

## 2025-01-28 NOTE — PROGRESS NOTES
SUBJECTIVE:                                                                   Felipe De Leon presents today to our Allergy Clinic at [facility]; He is being seen in consultation at the request of Dr. Bigg Moncada for an evaluation of sinusitis with nasal polyposis.    The patient reports ongoing sinus problems for the past 4 to 5 years, characterized by nasal congestion and anosmia. He previously smoked heavily and attributed his symptoms to smoking. After quitting smoking a year ago, he saw no improvement in symptoms. He subsequently had his teeth removed, hoping this would resolve the issue, but symptoms persisted.    He experiences bilateral nasal congestion with a perennial pattern and no seasonal exacerbations. He does not feel his symptoms worsen around cats or dogs.    He was evaluated by Dr. Moncada, who noted septal deviation and bilateral nasal polyposis. Previous treatments, including oral antihistamines and intranasal triamcinolone, were not helpful. Recently, he completed an oral steroid course and started using budesonide/saline irrigations, which seem to provide more relief than prior treatments. He has been using the budesonide/saline irrigations for less than a week.    The patient denies a history of asthma. Although he avoids ibuprofen due to ulcerative colitis, he has occasionally taken it for headaches without experiencing symptoms suggestive of NSAID-exacerbated respiratory disease.    Patient Active Problem List   Diagnosis    Primary insomnia    Ulcerative colitis with other complication, unspecified location (H)       No past medical history on file.   No data available          No past surgical history on file.  Social History     Socioeconomic History    Marital status:      Spouse name: None    Number of children: None    Years of education: None    Highest education level: None   Tobacco Use    Smoking status: Former     Types: Cigarettes    Tobacco  comments:     Quit in July 2022, current use of nicotine gum or lozenge      Started smoking aroung age 13, quit age 43. About 1-2 ppd. About 45 pack years    Substance and Sexual Activity    Alcohol use: No   Social History Narrative    January 28, 2025        ENVIRONMENTAL HISTORY: [place] The home is heated with a gas furnace. They does have central air conditioning. The patient's bedroom is furnished with carpeting in bedroom, hard sharon in bedroom, and fabric window coverings.  Pets inside the house include 3 cat(s). There is no history of cockroach or mice infestation. There is/are 0 smokers in the house.  The house does not have a damp basement.      Social Drivers of Health     Financial Resource Strain: Low Risk  (12/2/2024)    Financial Resource Strain     Within the past 12 months, have you or your family members you live with been unable to get utilities (heat, electricity) when it was really needed?: No   Food Insecurity: Low Risk  (12/2/2024)    Food Insecurity     Within the past 12 months, did you worry that your food would run out before you got money to buy more?: No     Within the past 12 months, did the food you bought just not last and you didn t have money to get more?: No   Transportation Needs: Low Risk  (12/2/2024)    Transportation Needs     Within the past 12 months, has lack of transportation kept you from medical appointments, getting your medicines, non-medical meetings or appointments, work, or from getting things that you need?: No   Physical Activity: Insufficiently Active (12/2/2024)    Exercise Vital Sign     Days of Exercise per Week: 1 day     Minutes of Exercise per Session: 30 min   Stress: No Stress Concern Present (12/2/2024)    [employer] [place] [employer] - Occupational Stress Questionnaire     Feeling of Stress : Not at all   Social Connections: Unknown (12/2/2024)    Social Connection and Isolation Panel  [NHANES]     Frequency of Social Gatherings with Friends and Family: Twice a week   Interpersonal Safety: Low Risk  (12/2/2024)    Interpersonal Safety     Do you feel physically and emotionally safe where you currently live?: Yes     Within the past 12 months, have you been hit, slapped, kicked or otherwise physically hurt by someone?: No     Within the past 12 months, have you been humiliated or emotionally abused in other ways by your partner or ex-partner?: No   Housing Stability: Low Risk  (12/2/2024)    Housing Stability     Do you have housing? : Yes     Are you worried about losing your housing?: No               Current Outpatient Medications:     budesonide (PULMICORT) 0.5 MG/2ML neb solution, Mix respule of 0.5mg/2 mL budesonide vial in 8oz normal saline sinus rinse bottle. Irrigate each nostril with half of the bottle twice daily, Disp: 120 mL, Rfl: 1    LIALDA 1.2 g DR tablet, TAKE TWO TABLETS BY MOUTH ONE TO TWO TIMES DAILY BASED ON SYMPTOMS, Disp: , Rfl:     traZODone (DESYREL) 100 MG tablet, Take 100 mg by mouth at bedtime., Disp: , Rfl:   Immunization History   Administered Date(s) Administered    DT (PEDS <7y) 10/29/1992    Pneumococcal 23 valent 10/12/2016    TDAP (Adacel,Boostrix) 10/12/2016    Td (Adult), Adsorbed 09/14/2000     No Known Allergies  OBJECTIVE:                                                                 /86 (BP Location: Left arm, Patient Position: Sitting, Cuff Size: Adult Large)   Pulse 69   Wt 113.5 kg (250 lb 3.6 oz)   SpO2 96%   BMI 32.63 kg/m              Physical Exam  Vitals and nursing note reviewed.   Constitutional:       General: He is not in acute distress.     Appearance: He is not diaphoretic.   HENT:      Head: Normocephalic and atraumatic.      Right Ear: Tympanic membrane, ear canal and external ear normal.      Left Ear: Tympanic membrane, ear canal and external ear normal.      Nose: No mucosal edema or rhinorrhea.      Right Turbinates: Not  enlarged or swollen.      Left Turbinates: Not enlarged or swollen.      Comments: In the middle meatus bilaterally, gray, translucent masses consistent with nasal polyposis.     Mouth/Throat:      Lips: Pink.      Mouth: Mucous membranes are moist.      Pharynx: Oropharynx is clear. No pharyngeal swelling, oropharyngeal exudate or posterior oropharyngeal erythema.   Eyes:      General:         Right eye: No discharge.         Left eye: No discharge.      Conjunctiva/sclera: Conjunctivae normal.   Cardiovascular:      Rate and Rhythm: Normal rate and regular rhythm.      Heart sounds: Normal heart sounds. No murmur heard.  Pulmonary:      Effort: Pulmonary effort is normal. No respiratory distress.      Breath sounds: Normal breath sounds and air entry. No stridor, decreased air movement or transmitted upper airway sounds. No decreased breath sounds, wheezing, rhonchi or rales.   Neurological:      Mental Status: He is alert and oriented to person, place, and time.   Psychiatric:         Mood and Affect: Mood normal.         Behavior: Behavior normal.             ASSESSMENT/PLAN:    Sinusitis with nasal polyps  The patient presents with persistent sinus issues, including nasal congestion and anosmia, associated with septal deviation and bilateral nasal polyposis.    Skin prick testing (SPT) for aeroallergens cannot be performed today as the patient took trazodone.  -Ordered serum IgE for regional aeroallergen panel to assess environmental sensitivities.  -Ordered CBC with differential and ANCAs to screen for hypereosinophilia and vasculitis.  -Continue budesonide/saline irrigations, which appear to be providing some relief.      Depending on symptom control, consider initiating a biologic therapy approved for nasal polyposis, such as dupilumab, if symptoms persist or worsen.    - Adult Allergy/Asthma  Referral  - IgE  - Allergen cat epithellium IgE  - Allergen dog epithelium IgE  - Allergen Jeremi grass  IgE  - Allergen orchard grass IgE  - Allergen howard IgE  - Allergen D farinae IgE  - Allergen D pteronyssinus IgE  - Allergen alternaria alternata IgE  - Allergen aspergillus fumigatus IgE  - Allergen cladosporium herbarum IgE  - Allergen Epicoccum purpurascens IgE  - Allergen penicillium notatum IgE  - Allergen joce white IgE  - Allergen Cedar IgE  - Allergen cottonwood IgE  - Allergen elm IgE  - Allergen maple box elder IgE  - Allergen oak white IgE  - Allergen Red [place] IgE  - Allergen silver  birch IgE  - Allergen Tree White [place] IgE  - Allergen [place] Tree  - Allergen white pine IgE  - Allergen English plantain IgE  - Allergen giant ragweed IgE  - Allergen lamb's quarter IgE  - Allergen Mugwort IgE  - Allergen ragweed short IgE  - Allergen Sagebrush Wormwood IgE  - Allergen Sheep Sorrel IgE  - Allergen thistle Russian IgE  - Allergen Weed Nettle IgE  - Allergen, Kochia/Firebush  - CBC with Platelets & Differential  - ANCA IgG by IFA with Reflex to Titer  - Myeloperoxidase and Proteinase 3 Panel       Follow-up in 6 weeks or sooner if needed.    Thank you for allowing us to participate in the care of this patient. Please feel free to contact us if there are any questions or concerns about the patient.    Disclaimer: This note consists of symbols derived from keyboarding, dictation and/or voice recognition software. As a result, there may be errors in the script that have gone undetected. Please consider this when interpreting information found in this chart.        Ryder Anand MD, FAAAAI, FACAAI  Allergy and Asthma     [facility]

## 2025-01-28 NOTE — LETTER
1/28/2025      Felipe De Leon  [address] [place] [address]      Dear Colleague,    Thank you for referring your patient, Felipe De Leon, to the [facility]. Please see a copy of my visit note below.    SUBJECTIVE:                                                                   Felipe De Leon presents today to our Allergy Clinic at [facility]; He is being seen in consultation at the request of Dr. Bigg Moncada for an evaluation of sinusitis with nasal polyposis.    The patient reports ongoing sinus problems for the past 4 to 5 years, characterized by nasal congestion and anosmia. He previously smoked heavily and attributed his symptoms to smoking. After quitting smoking a year ago, he saw no improvement in symptoms. He subsequently had his teeth removed, hoping this would resolve the issue, but symptoms persisted.    He experiences bilateral nasal congestion with a perennial pattern and no seasonal exacerbations. He does not feel his symptoms worsen around cats or dogs.    He was evaluated by Dr. Moncada, who noted septal deviation and bilateral nasal polyposis. Previous treatments, including oral antihistamines and intranasal triamcinolone, were not helpful. Recently, he completed an oral steroid course and started using budesonide/saline irrigations, which seem to provide more relief than prior treatments. He has been using the budesonide/saline irrigations for less than a week.    The patient denies a history of asthma. Although he avoids ibuprofen due to ulcerative colitis, he has occasionally taken it for headaches without experiencing symptoms suggestive of NSAID-exacerbated respiratory disease.    Patient Active Problem List   Diagnosis     Primary insomnia     Ulcerative colitis with other complication, unspecified location (H)       No past medical history on file.   No data available          No past surgical history on file.  Social  History     Socioeconomic History     Marital status:      Spouse name: None     Number of children: None     Years of education: None     Highest education level: None   Tobacco Use     Smoking status: Former     Types: Cigarettes     Tobacco comments:     Quit in July 2022, current use of nicotine gum or lozenge      Started smoking aroung age 13, quit age 43. About 1-2 ppd. About 45 pack years    Substance and Sexual Activity     Alcohol use: No   Social History Narrative    January 28, 2025        ENVIRONMENTAL HISTORY: [place] The home is heated with a gas furnace. They does have central air conditioning. The patient's bedroom is furnished with carpeting in bedroom, hard shraon in bedroom, and fabric window coverings.  Pets inside the house include 3 cat(s). There is no history of cockroach or mice infestation. There is/are 0 smokers in the house.  The house does not have a damp basement.      Social Drivers of Health     Financial Resource Strain: Low Risk  (12/2/2024)    Financial Resource Strain      Within the past 12 months, have you or your family members you live with been unable to get utilities (heat, electricity) when it was really needed?: No   Food Insecurity: Low Risk  (12/2/2024)    Food Insecurity      Within the past 12 months, did you worry that your food would run out before you got money to buy more?: No      Within the past 12 months, did the food you bought just not last and you didn t have money to get more?: No   Transportation Needs: Low Risk  (12/2/2024)    Transportation Needs      Within the past 12 months, has lack of transportation kept you from medical appointments, getting your medicines, non-medical meetings or appointments, work, or from getting things that you need?: No   Physical Activity: Insufficiently Active (12/2/2024)    Exercise Vital Sign      Days of Exercise per Week: 1 day      Minutes of Exercise per Session:  30 min   Stress: No Stress Concern Present (12/2/2024)    [employer] [employer] - Occupational Stress Questionnaire      Feeling of Stress : Not at all   Social Connections: Unknown (12/2/2024)    Social Connection and Isolation Panel [NHANES]      Frequency of Social Gatherings with Friends and Family: Twice a week   Interpersonal Safety: Low Risk  (12/2/2024)    Interpersonal Safety      Do you feel physically and emotionally safe where you currently live?: Yes      Within the past 12 months, have you been hit, slapped, kicked or otherwise physically hurt by someone?: No      Within the past 12 months, have you been humiliated or emotionally abused in other ways by your partner or ex-partner?: No   Housing Stability: Low Risk  (12/2/2024)    Housing Stability      Do you have housing? : Yes      Are you worried about losing your housing?: No               Current Outpatient Medications:      budesonide (PULMICORT) 0.5 MG/2ML neb solution, Mix respule of 0.5mg/2 mL budesonide vial in 8oz normal saline sinus rinse bottle. Irrigate each nostril with half of the bottle twice daily, Disp: 120 mL, Rfl: 1     LIALDA 1.2 g DR tablet, TAKE TWO TABLETS BY MOUTH ONE TO TWO TIMES DAILY BASED ON SYMPTOMS, Disp: , Rfl:      traZODone (DESYREL) 100 MG tablet, Take 100 mg by mouth at bedtime., Disp: , Rfl:   Immunization History   Administered Date(s) Administered     DT (PEDS <7y) 10/29/1992     Pneumococcal 23 valent 10/12/2016     TDAP (Adacel,Boostrix) 10/12/2016     Td (Adult), Adsorbed 09/14/2000     No Known Allergies  OBJECTIVE:                                                                 /86 (BP Location: Left arm, Patient Position: Sitting, Cuff Size: Adult Large)   Pulse 69   Wt 113.5 kg (250 lb 3.6 oz)   SpO2 96%   BMI 32.63 kg/m              Physical Exam  Vitals and nursing note reviewed.   Constitutional:       General: He is not in acute distress.     Appearance: He is not  diaphoretic.   HENT:      Head: Normocephalic and atraumatic.      Right Ear: Tympanic membrane, ear canal and external ear normal.      Left Ear: Tympanic membrane, ear canal and external ear normal.      Nose: No mucosal edema or rhinorrhea.      Right Turbinates: Not enlarged or swollen.      Left Turbinates: Not enlarged or swollen.      Comments: In the middle meatus bilaterally, gray, translucent masses consistent with nasal polyposis.     Mouth/Throat:      Lips: Pink.      Mouth: Mucous membranes are moist.      Pharynx: Oropharynx is clear. No pharyngeal swelling, oropharyngeal exudate or posterior oropharyngeal erythema.   Eyes:      General:         Right eye: No discharge.         Left eye: No discharge.      Conjunctiva/sclera: Conjunctivae normal.   Cardiovascular:      Rate and Rhythm: Normal rate and regular rhythm.      Heart sounds: Normal heart sounds. No murmur heard.  Pulmonary:      Effort: Pulmonary effort is normal. No respiratory distress.      Breath sounds: Normal breath sounds and air entry. No stridor, decreased air movement or transmitted upper airway sounds. No decreased breath sounds, wheezing, rhonchi or rales.   Neurological:      Mental Status: He is alert and oriented to person, place, and time.   Psychiatric:         Mood and Affect: Mood normal.         Behavior: Behavior normal.             ASSESSMENT/PLAN:    Sinusitis with nasal polyps  The patient presents with persistent sinus issues, including nasal congestion and anosmia, associated with septal deviation and bilateral nasal polyposis.    Skin prick testing (SPT) for aeroallergens cannot be performed today as the patient took trazodone.  -Ordered serum IgE for regional aeroallergen panel to assess environmental sensitivities.  -Ordered CBC with differential and ANCAs to screen for hypereosinophilia and vasculitis.  -Continue budesonide/saline irrigations, which appear to be providing some relief.      Depending on symptom  control, consider initiating a biologic therapy approved for nasal polyposis, such as dupilumab, if symptoms persist or worsen.    - Adult Allergy/Asthma  Referral  - IgE  - Allergen cat epithellium IgE  - Allergen dog epithelium IgE  - Allergen Jeremi grass IgE  - Allergen orchard grass IgE  - Allergen howard IgE  - Allergen D farinae IgE  - Allergen D pteronyssinus IgE  - Allergen alternaria alternata IgE  - Allergen aspergillus fumigatus IgE  - Allergen cladosporium herbarum IgE  - Allergen Epicoccum purpurascens IgE  - Allergen penicillium notatum IgE  - Allergen joce white IgE  - Allergen Cedar IgE  - Allergen cottonwood IgE  - Allergen elm IgE  - Allergen maple box elder IgE  - Allergen oak white IgE  - Allergen Red [place] IgE  - Allergen silver  birch IgE  - Allergen Tree White [place] IgE  - Allergen [place] Tree  - Allergen white pine IgE  - Allergen English plantain IgE  - Allergen giant ragweed IgE  - Allergen lamb's quarter IgE  - Allergen Mugwort IgE  - Allergen ragweed short IgE  - Allergen Sagebrush [place] IgE  - Allergen Sheep Sorrel IgE  - Allergen thistle [place] IgE  - Allergen Weed Nettle IgE  - Allergen, Kochia/Firebush  - CBC with Platelets & Differential  - ANCA IgG by IFA with Reflex to Titer  - Myeloperoxidase and Proteinase 3 Panel       Follow-up in 6 weeks or sooner if needed.    Thank you for allowing us to participate in the care of this patient. Please feel free to contact us if there are any questions or concerns about the patient.    Disclaimer: This note consists of symbols derived from keyboarding, dictation and/or voice recognition software. As a result, there may be errors in the script that have gone undetected. Please consider this when interpreting information found in this chart.        Ryder Anand MD, FAAAAI, FACAAI  Allergy and Asthma     [facility]        Again, thank you for allowing me to  participate in the care of your patient.        Sincerely,        Ryder Anand MD    Electronically signed

## 2025-01-29 LAB
A ALTERNATA IGE QN: <0.1 KU(A)/L
A FUMIGATUS IGE QN: <0.1 KU(A)/L
C HERBARUM IGE QN: <0.1 KU(A)/L
CALIF WALNUT POLN IGE QN: <0.1 KU(A)/L
CAT DANDER IGG QN: <0.1 KU(A)/L
CEDAR IGE QN: <0.1 KU(A)/L
COCKSFOOT IGE QN: <0.1 KU(A)/L
COMMON RAGWEED IGE QN: <0.1 KU(A)/L
COTTONWOOD IGE QN: <0.1 KU(A)/L
D FARINAE IGE QN: <0.1 KU(A)/L
D PTERONYSS IGE QN: <0.1 KU(A)/L
DOG DANDER+EPITH IGE QN: <0.1 KU(A)/L
E PURPURASCENS IGE QN: <0.1 KU(A)/L
EAST WHITE PINE IGE QN: <0.1 KU(A)/L
ENGL PLANTAIN IGE QN: <0.1 KU(A)/L
FIREBUSH IGE QN: <0.1 KU(A)/L
GIANT RAGWEED IGE QN: <0.1 KU(A)/L
GOOSEFOOT IGE QN: <0.1 KU(A)/L
IGE SERPL-ACNC: 24 KU/L (ref 0–114)
JOHNSON GRASS IGE QN: <0.1 KU(A)/L
MAPLE IGE QN: <0.1 KU(A)/L
MUGWORT IGE QN: <0.1 KU(A)/L
NETTLE IGE QN: <0.1 KU(A)/L
P NOTATUM IGE QN: <0.1 KU(A)/L
RED MULBERRY IGE QN: <0.1 KU(A)/L
SALTWORT IGE QN: <0.1 KU(A)/L
SHEEP SORREL IGE QN: <0.1 KU(A)/L
SILVER BIRCH IGE QN: <0.1 KU(A)/L
TIMOTHY IGE QN: <0.1 KU(A)/L
WHITE ASH IGE QN: <0.1 KU(A)/L
WHITE ELM IGE QN: <0.1 KU(A)/L
WHITE MULBERRY IGE QN: <0.1 KU(A)/L
WHITE OAK IGE QN: <0.1 KU(A)/L
WORMWOOD IGE QN: <0.1 KU(A)/L

## 2025-01-30 LAB
ANCA AB PATTERN SER IF-IMP: NORMAL
C-ANCA TITR SER IF: NORMAL {TITER}

## 2025-01-31 ENCOUNTER — ANCILLARY PROCEDURE (OUTPATIENT)
Dept: CT IMAGING | Facility: CLINIC | Age: 45
End: 2025-01-31
Attending: OTOLARYNGOLOGY
Payer: COMMERCIAL

## 2025-01-31 DIAGNOSIS — J33.9 SINUSITIS WITH NASAL POLYPS: ICD-10-CM

## 2025-01-31 DIAGNOSIS — J32.9 SINUSITIS WITH NASAL POLYPS: ICD-10-CM

## 2025-01-31 PROCEDURE — 70486 CT MAXILLOFACIAL W/O DYE: CPT | Performed by: RADIOLOGY

## 2025-02-18 DIAGNOSIS — F51.01 PRIMARY INSOMNIA: Primary | ICD-10-CM

## 2025-02-18 NOTE — TELEPHONE ENCOUNTER
Clinic RN: Please investigate patient's chart or contact patient if the information cannot be found because the medication is listed as historical or discontinued. Confirm patient is taking this medication. Document findings and route refill encounter to provider for approval or denial.    Hilda Escalera RN on 2/18/2025 at 2:20 PM

## 2025-02-19 NOTE — TELEPHONE ENCOUNTER
Called and spoke with patient.   Medication was previously managed by alternate provider and has now established care with Dr. Camacho would like provider to manage going forward.     Anna Marie RITTERN, RN

## 2025-02-20 RX ORDER — TRAZODONE HYDROCHLORIDE 100 MG/1
100 TABLET ORAL AT BEDTIME
Qty: 90 TABLET | Refills: 2 | Status: SHIPPED | OUTPATIENT
Start: 2025-02-20

## 2025-03-12 NOTE — PROGRESS NOTES
History of Present Illness - Felipe eD Leon is a 44 year old male presenting in clinic today for a recheck on Patient presents with:  RECHECK: Sinusitis with nasal polyps    Presented primarily with symptoms of nasal obstruction but was noted to have some polypoid changes in his sinuses.  There was based on endoscopy.  He also had sense of smell changes with significant decline in sense of smell over the last few years.  No headaches no pressure no purulence were noted.  Patient has tried budesonide irrigations with minimal improvement.  Still has a lot of difficulty breathing through his nose.  Full CT of the sinuses was performed to further evaluate the contents of the sinuses and potential effect on his symptoms.  CT SINUS W/O CONTRAST 1/31/2025 8:54 AM     Provided History: DICOM mode for [employer] navigation system;  Sinusitis with nasal polyps; Sinusitis with nasal polyps  ICD-10: Sinusitis with nasal polyps; Sinusitis with nasal polyps     Comparison: Head CT 8/24/2004.      Technique:  Using thin collimation multidetector helical acquisition  technique, axial, coronal, and sagittal thin section CT images were  reconstructed through the paranasal sinuses. Images were reviewed in  bone and soft tissue windows.     Findings:   Maxillary sinuses: Mucosal thickening and layering fluid bilaterally.  Sphenoid sinus: Mild mucosal thickening.  Frontal sinus: Clear.  Ethmoid air cells: Moderate bilateral mucosal thickening and regions  of frothy debris.     Narrowing of the bilateral ostiomeatal units secondary to mucosal  thickening. The bony walls of the paranasal sinuses are intact.     Normal retromaxillary and pterygopalatine fat.     The adenoid tonsils in the nasopharynx are unremarkable.     Maxillary dental implants.                                                                      Impression: Acute sinusitis.     Today to go over the results of the CT scan thoroughly with the patient.    BP Readings  from Last 1 Encounters:   01/28/25 138/86       BP noted to be well controlled today in office.     Felipe IS NOT a smoker/uses chewing tobacco.  Felipe already quit      Past Medical History - No past medical history on file.    Current Medications -   Current Outpatient Medications:     budesonide (PULMICORT) 0.5 MG/2ML neb solution, Mix respule of 0.5mg/2 mL budesonide vial in 8oz normal saline sinus rinse bottle. Irrigate each nostril with half of the bottle twice daily, Disp: 120 mL, Rfl: 1    LIALDA 1.2 g DR tablet, TAKE TWO TABLETS BY MOUTH ONE TO TWO TIMES DAILY BASED ON SYMPTOMS, Disp: , Rfl:     traZODone (DESYREL) 100 MG tablet, Take 1 tablet (100 mg) by mouth at bedtime., Disp: 90 tablet, Rfl: 2    Allergies - No Known Allergies    Social History -   Social History     Socioeconomic History    Marital status:    Tobacco Use    Smoking status: Former     Types: Cigarettes    Tobacco comments:     Quit in July 2022, current use of nicotine gum or lozenge      Started smoking aroung age 13, quit age 43. About 1-2 ppd. About 45 pack years    Substance and Sexual Activity    Alcohol use: No   Social History Narrative    January 28, 2025        ENVIRONMENTAL HISTORY: [place] The home is heated with a gas furnace. They does have central air conditioning. The patient's bedroom is furnished with carpeting in bedroom, hard sharon in bedroom, and fabric window coverings.  Pets inside the house include 3 cat(s). There is no history of cockroach or mice infestation. There is/are 0 smokers in the house.  The house does not have a damp basement.      Social Drivers of Health     Financial Resource Strain: Low Risk  (12/2/2024)    Financial Resource Strain     Within the past 12 months, have you or your family members you live with been unable to get utilities (heat, electricity) when it was really needed?: No   Food Insecurity: Low Risk  (12/2/2024)    Food  Insecurity     Within the past 12 months, did you worry that your food would run out before you got money to buy more?: No     Within the past 12 months, did the food you bought just not last and you didn t have money to get more?: No   Transportation Needs: Low Risk  (12/2/2024)    Transportation Needs     Within the past 12 months, has lack of transportation kept you from medical appointments, getting your medicines, non-medical meetings or appointments, work, or from getting things that you need?: No   Physical Activity: Insufficiently Active (12/2/2024)    Exercise Vital Sign     Days of Exercise per Week: 1 day     Minutes of Exercise per Session: 30 min   Stress: No Stress Concern Present (12/2/2024)    [employer] - Occupational Stress Questionnaire     Feeling of Stress : Not at all   Social Connections: Unknown (12/2/2024)    Social Connection and Isolation Panel [NHANES]     Frequency of Social Gatherings with Friends and Family: Twice a week   Interpersonal Safety: Low Risk  (12/2/2024)    Interpersonal Safety     Do you feel physically and emotionally safe where you currently live?: Yes     Within the past 12 months, have you been hit, slapped, kicked or otherwise physically hurt by someone?: No     Within the past 12 months, have you been humiliated or emotionally abused in other ways by your partner or ex-partner?: No   Housing Stability: Low Risk  (12/2/2024)    Housing Stability     Do you have housing? : Yes     Are you worried about losing your housing?: No       Family History - No family history on file.    Review of Systems - As per HPI and PMHx, otherwise review of system review of the head and neck negative. Otherwise 10+ review of system is negative    Physical Exam  There were no vitals taken for this visit.  BMI: There is no height or weight on file to calculate BMI.    General - The patient is well nourished and well developed, and appears to have good  nutritional status.  Alert and oriented to person and place, answers questions and cooperates with examination appropriately.    SKIN - No suspicious lesions or rashes.  Respiration - No respiratory distress.  Head and Face - Normocephalic and atraumatic, with no gross asymmetry noted of the contour of the facial features.  The facial nerve is intact, with strong symmetric movements.    Voice and Breathing - The patient was breathing comfortably without the use of accessory muscles. The patients voice was clear and strong, and had appropriate pitch and quality.    Ears - Bilateral pinna and EACs with normal appearing overlying skin. Tympanic membrane intact with good mobility on pneumatic otoscopy bilaterally. Bony landmarks of the ossicular chain are normal. The tympanic membranes are normal in appearance. No retraction, perforation, or masses.  No fluid or purulence was seen in the external canal or the middle ear.     Eyes - Extraocular movements intact.  Sclera were not icteric or injected, conjunctiva were pink and moist.    Mouth - Examination of the oral cavity showed pink, healthy oral mucosa. No lesions or ulcerations noted.  The tongue was mobile and midline, and the dentition were in good condition.      Throat - The walls of the oropharynx were smooth, pink, moist, symmetric, and had no lesions or ulcerations.  The tonsillar pillars and soft palate were symmetric. The uvula was midline on elevation.    Neck - Normal midline excursion of the laryngotracheal complex during swallowing.  Full range of motion on passive movement.  Palpation of the occipital, submental, submandibular, internal jugular chain, and supraclavicular nodes did not demonstrate any abnormal lymph nodes or masses.  The carotid pulse was palpable bilaterally.  Palpation of the thyroid was soft and smooth, with no nodules or goiter appreciated.  The trachea was mobile and midline.    Nose - External contour is symmetric, no gross  deflection or scars.  Nasal mucosa is pink and moist with no abnormal mucus.  The septum was midline and non-obstructive, turbinates of normal size and position.  No polyps, masses, or purulence noted on examination.    Neuro - Nonfocal neuro exam is normal, CN 2 through 12 intact, normal gait and muscle tone.      A/P - Felipe De Leon is a 44 year old male Patient presents with:  RECHECK: Sinusitis with nasal polyps    Today we had a long discussion with the patient presenting realistic expectations of intervening into sinus disease.  Considering he does not have any other symptoms then perhaps related sense of smell issue which may or may not respond to further aggressive therapy of his sinuses including surgery patient is really not interested in pursuing his sinuses at this point.  His main concern is nasal congestion and obstruction.  Therefore we discussed septoplasty and submucous resection of turbinates.We discuss risks and possible complication of septoplasty including septal perforation, bleeding(that may require packing), infection, loss of smell, stenosis, CSF rhinorrhea. With this knowledge the patient wishes to think about potential surgery in the future in the meantime continue saline irrigations twice daily and Flonase.  He will follow-up in 4 months.        Bigg Moncada MD

## 2025-03-20 DIAGNOSIS — J33.9 SINUSITIS WITH NASAL POLYPS: ICD-10-CM

## 2025-03-20 DIAGNOSIS — J32.9 SINUSITIS WITH NASAL POLYPS: ICD-10-CM

## 2025-03-20 NOTE — TELEPHONE ENCOUNTER
budesonide  Last Written Prescription Date:  1/22/25  Last Fill Quantity: 120ml,  # refills: 1   Last office visit: 1/22/2025 with prescribing provider:     Future Office Visit:      Requested Prescriptions   Pending Prescriptions Disp Refills    budesonide (PULMICORT) 0.5 MG/2ML neb solution [Pharmacy Med Name: BUDESONIDE 0.5MG/2ML VIALS, 2ML] 120 mL 1     Sig: MIX RESPULE OF 0.5MG/2 ML BUDESONIDE VIAL IN 8OZ NORMAL SALINE SINUS RINSE. IRRIGATE EACH NOSTRIL WITH HALF OF THE BOTTLE TWICE DAILY       Inhaled Steroids Protocol Failed - 3/20/2025  8:32 AM        Failed - Medication is active on med list and the sig matches. RN to manually verify dose and sig if red X/fail.     If the protocol passes (green check), you do not need to verify med dose and sig.    A prescription matches if they are the same clinical intention.    For Example: once daily and every morning are the same.    The protocol can not identify upper and lower case letters as matching and will fail.     For Example: Take 1 tablet (50 mg) by mouth daily     TAKE 1 TABLET (50 MG) BY MOUTH DAILY    For all fails (red x), verify dose and sig.    If the refill does match what is on file, the RN can still proceed to approve the refill request.       If they do not match, route to the appropriate provider.             Passed - Patient is age 12 or older        Passed - Recent (12 mo) or future (90 days) visit within the authorizing provider's specialty     The patient must have completed an in-person or virtual visit within the past 12 months or has a future visit scheduled within the next 90 days with the authorizing provider s specialty.  Urgent care and e-visits do not qualify as an office visit for this protocol.          Passed - Medication indicated for associated diagnosis     Medication is associated with one or more of the following diagnoses:    Allergies   Asthma   COPD   Nasal Congestion   Nasal Polyps   Rhinitis   Cystic  Fibrosis   Bronchiectasis               Routing refill request to provider for review/approval because:  Didn't pass protocol. Has appointment in 6 days.        Shonda Adame RN on 3/20/2025 at 8:32 AM

## 2025-03-25 RX ORDER — BUDESONIDE 0.5 MG/2ML
INHALANT ORAL
Qty: 120 ML | Refills: 1 | Status: SHIPPED | OUTPATIENT
Start: 2025-03-25

## 2025-03-26 ENCOUNTER — OFFICE VISIT (OUTPATIENT)
Dept: OTOLARYNGOLOGY | Facility: OTHER | Age: 45
End: 2025-03-26
Payer: COMMERCIAL

## 2025-03-26 VITALS
HEIGHT: 73 IN | WEIGHT: 250.44 LBS | TEMPERATURE: 97.6 F | SYSTOLIC BLOOD PRESSURE: 130 MMHG | DIASTOLIC BLOOD PRESSURE: 80 MMHG | BODY MASS INDEX: 33.19 KG/M2

## 2025-03-26 DIAGNOSIS — J32.4 CHRONIC PANSINUSITIS: ICD-10-CM

## 2025-03-26 DIAGNOSIS — J34.89 NASAL OBSTRUCTION: ICD-10-CM

## 2025-03-26 DIAGNOSIS — J34.3 HYPERTROPHY OF BOTH INFERIOR NASAL TURBINATES: ICD-10-CM

## 2025-03-26 DIAGNOSIS — J34.2 DEVIATED NASAL SEPTUM: Primary | ICD-10-CM

## 2025-03-26 PROCEDURE — 3079F DIAST BP 80-89 MM HG: CPT | Performed by: OTOLARYNGOLOGY

## 2025-03-26 PROCEDURE — 3075F SYST BP GE 130 - 139MM HG: CPT | Performed by: OTOLARYNGOLOGY

## 2025-03-26 PROCEDURE — 99214 OFFICE O/P EST MOD 30 MIN: CPT | Performed by: OTOLARYNGOLOGY

## 2025-03-26 NOTE — LETTER
3/26/2025      Felipe De Leon  [address]  [place] [address]      Dear Colleague,    Thank you for referring your patient, Felipe De Leon, to the [facility]. Please see a copy of my visit note below.    History of Present Illness - Felipe De Leon is a 44 year old male presenting in clinic today for a recheck on Patient presents with:  RECHECK: Sinusitis with nasal polyps    Presented primarily with symptoms of nasal obstruction but was noted to have some polypoid changes in his sinuses.  There was based on endoscopy.  He also had sense of smell changes with significant decline in sense of smell over the last few years.  No headaches no pressure no purulence were noted.  Patient has tried budesonide irrigations with minimal improvement.  Still has a lot of difficulty breathing through his nose.  Full CT of the sinuses was performed to further evaluate the contents of the sinuses and potential effect on his symptoms.  CT SINUS W/O CONTRAST 1/31/2025 8:54 AM     Provided History: DICOM mode for [employer] navigation system;  Sinusitis with nasal polyps; Sinusitis with nasal polyps  ICD-10: Sinusitis with nasal polyps; Sinusitis with nasal polyps     Comparison: Head CT 8/24/2004.      Technique:  Using thin collimation multidetector helical acquisition  technique, axial, coronal, and sagittal thin section CT images were  reconstructed through the paranasal sinuses. Images were reviewed in  bone and soft tissue windows.     Findings:   Maxillary sinuses: Mucosal thickening and layering fluid bilaterally.  Sphenoid sinus: Mild mucosal thickening.  Frontal sinus: Clear.  Ethmoid air cells: Moderate bilateral mucosal thickening and regions  of frothy debris.     Narrowing of the bilateral ostiomeatal units secondary to mucosal  thickening. The bony walls of the paranasal sinuses are intact.     Normal retromaxillary and pterygopalatine fat.     The adenoid tonsils in the nasopharynx  are unremarkable.     Maxillary dental implants.                                                                      Impression: Acute sinusitis.     Today to go over the results of the CT scan thoroughly with the patient.    BP Readings from Last 1 Encounters:   01/28/25 138/86       BP noted to be well controlled today in office.     Felipe IS NOT a smoker/uses chewing tobacco.  Felipe already quit      Past Medical History - No past medical history on file.    Current Medications -   Current Outpatient Medications:      budesonide (PULMICORT) 0.5 MG/2ML neb solution, Mix respule of 0.5mg/2 mL budesonide vial in 8oz normal saline sinus rinse bottle. Irrigate each nostril with half of the bottle twice daily, Disp: 120 mL, Rfl: 1     LIALDA 1.2 g DR tablet, TAKE TWO TABLETS BY MOUTH ONE TO TWO TIMES DAILY BASED ON SYMPTOMS, Disp: , Rfl:      traZODone (DESYREL) 100 MG tablet, Take 1 tablet (100 mg) by mouth at bedtime., Disp: 90 tablet, Rfl: 2    Allergies - No Known Allergies    Social History -   Social History     Socioeconomic History     Marital status:    Tobacco Use     Smoking status: Former     Types: Cigarettes     Tobacco comments:     Quit in July 2022, current use of nicotine gum or lozenge      Started smoking aroung age 13, quit age 43. About 1-2 ppd. About 45 pack years    Substance and Sexual Activity     Alcohol use: No   Social History Narrative    January 28, 2025        ENVIRONMENTAL HISTORY: [place] The home is heated with a gas furnace. They does have central air conditioning. The patient's bedroom is furnished with carpeting in bedroom, hard sharon in bedroom, and fabric window coverings.  Pets inside the house include 3 cat(s). There is no history of cockroach or mice infestation. There is/are 0 smokers in the house.  The house does not have a damp basement.      Social Drivers of Health     Financial Resource Strain: Low Risk   (12/2/2024)    Financial Resource Strain      Within the past 12 months, have you or your family members you live with been unable to get utilities (heat, electricity) when it was really needed?: No   Food Insecurity: Low Risk  (12/2/2024)    Food Insecurity      Within the past 12 months, did you worry that your food would run out before you got money to buy more?: No      Within the past 12 months, did the food you bought just not last and you didn t have money to get more?: No   Transportation Needs: Low Risk  (12/2/2024)    Transportation Needs      Within the past 12 months, has lack of transportation kept you from medical appointments, getting your medicines, non-medical meetings or appointments, work, or from getting things that you need?: No   Physical Activity: Insufficiently Active (12/2/2024)    Exercise Vital Sign      Days of Exercise per Week: 1 day      Minutes of Exercise per Session: 30 min   Stress: No Stress Concern Present (12/2/2024)    [employer] - Occupational Stress Questionnaire      Feeling of Stress : Not at all   Social Connections: Unknown (12/2/2024)    Social Connection and Isolation Panel [NHANES]      Frequency of Social Gatherings with Friends and Family: Twice a week   Interpersonal Safety: Low Risk  (12/2/2024)    Interpersonal Safety      Do you feel physically and emotionally safe where you currently live?: Yes      Within the past 12 months, have you been hit, slapped, kicked or otherwise physically hurt by someone?: No      Within the past 12 months, have you been humiliated or emotionally abused in other ways by your partner or ex-partner?: No   Housing Stability: Low Risk  (12/2/2024)    Housing Stability      Do you have housing? : Yes      Are you worried about losing your housing?: No       Family History - No family history on file.    Review of Systems - As per HPI and PMHx, otherwise review of system review of the head and neck negative.  Otherwise 10+ review of system is negative    Physical Exam  There were no vitals taken for this visit.  BMI: There is no height or weight on file to calculate BMI.    General - The patient is well nourished and well developed, and appears to have good nutritional status.  Alert and oriented to person and place, answers questions and cooperates with examination appropriately.    SKIN - No suspicious lesions or rashes.  Respiration - No respiratory distress.  Head and Face - Normocephalic and atraumatic, with no gross asymmetry noted of the contour of the facial features.  The facial nerve is intact, with strong symmetric movements.    Voice and Breathing - The patient was breathing comfortably without the use of accessory muscles. The patients voice was clear and strong, and had appropriate pitch and quality.    Ears - Bilateral pinna and EACs with normal appearing overlying skin. Tympanic membrane intact with good mobility on pneumatic otoscopy bilaterally. Bony landmarks of the ossicular chain are normal. The tympanic membranes are normal in appearance. No retraction, perforation, or masses.  No fluid or purulence was seen in the external canal or the middle ear.     Eyes - Extraocular movements intact.  Sclera were not icteric or injected, conjunctiva were pink and moist.    Mouth - Examination of the oral cavity showed pink, healthy oral mucosa. No lesions or ulcerations noted.  The tongue was mobile and midline, and the dentition were in good condition.      Throat - The walls of the oropharynx were smooth, pink, moist, symmetric, and had no lesions or ulcerations.  The tonsillar pillars and soft palate were symmetric. The uvula was midline on elevation.    Neck - Normal midline excursion of the laryngotracheal complex during swallowing.  Full range of motion on passive movement.  Palpation of the occipital, submental, submandibular, internal jugular chain, and supraclavicular nodes did not demonstrate any  abnormal lymph nodes or masses.  The carotid pulse was palpable bilaterally.  Palpation of the thyroid was soft and smooth, with no nodules or goiter appreciated.  The trachea was mobile and midline.    Nose - External contour is symmetric, no gross deflection or scars.  Nasal mucosa is pink and moist with no abnormal mucus.  The septum was midline and non-obstructive, turbinates of normal size and position.  No polyps, masses, or purulence noted on examination.    Neuro - Nonfocal neuro exam is normal, CN 2 through 12 intact, normal gait and muscle tone.      A/P - Felipe De Leon is a 44 year old male Patient presents with:  RECHECK: Sinusitis with nasal polyps    Today we had a long discussion with the patient presenting realistic expectations of intervening into sinus disease.  Considering he does not have any other symptoms then perhaps related sense of smell issue which may or may not respond to further aggressive therapy of his sinuses including surgery patient is really not interested in pursuing his sinuses at this point.  His main concern is nasal congestion and obstruction.  Therefore we discussed septoplasty and submucous resection of turbinates.We discuss risks and possible complication of septoplasty including septal perforation, bleeding(that may require packing), infection, loss of smell, stenosis, CSF rhinorrhea. With this knowledge the patient wishes to think about potential surgery in the future in the meantime continue saline irrigations twice daily and Flonase.  He will follow-up in 4 months.        Bigg Moncada MD          Again, thank you for allowing me to participate in the care of your patient.        Sincerely,        Bigg Moncada MD, MD    Electronically signed

## 2025-08-21 DIAGNOSIS — F51.01 PRIMARY INSOMNIA: ICD-10-CM

## 2025-08-21 RX ORDER — TRAZODONE HYDROCHLORIDE 100 MG/1
100 TABLET ORAL AT BEDTIME
Qty: 90 TABLET | Refills: 2 | OUTPATIENT
Start: 2025-08-21